# Patient Record
Sex: FEMALE | Race: WHITE | HISPANIC OR LATINO
[De-identification: names, ages, dates, MRNs, and addresses within clinical notes are randomized per-mention and may not be internally consistent; named-entity substitution may affect disease eponyms.]

---

## 2021-05-18 PROBLEM — Z00.00 ENCOUNTER FOR PREVENTIVE HEALTH EXAMINATION: Status: ACTIVE | Noted: 2021-05-18

## 2021-05-27 ENCOUNTER — APPOINTMENT (OUTPATIENT)
Dept: OBGYN | Facility: CLINIC | Age: 41
End: 2021-05-27
Payer: COMMERCIAL

## 2021-05-27 ENCOUNTER — LABORATORY RESULT (OUTPATIENT)
Age: 41
End: 2021-05-27

## 2021-05-27 VITALS
DIASTOLIC BLOOD PRESSURE: 80 MMHG | HEIGHT: 70 IN | BODY MASS INDEX: 25.77 KG/M2 | WEIGHT: 180 LBS | SYSTOLIC BLOOD PRESSURE: 118 MMHG

## 2021-05-27 DIAGNOSIS — Z01.419 ENCOUNTER FOR GYNECOLOGICAL EXAMINATION (GENERAL) (ROUTINE) W/OUT ABNORMAL FINDINGS: ICD-10-CM

## 2021-05-27 PROCEDURE — 99072 ADDL SUPL MATRL&STAF TM PHE: CPT

## 2021-05-27 PROCEDURE — 99385 PREV VISIT NEW AGE 18-39: CPT

## 2021-05-27 PROCEDURE — 36415 COLL VENOUS BLD VENIPUNCTURE: CPT

## 2021-05-27 PROCEDURE — 99203 OFFICE O/P NEW LOW 30 MIN: CPT | Mod: 25

## 2021-05-27 RX ORDER — LEVOTHYROXINE SODIUM 0.17 MG/1
TABLET ORAL
Refills: 0 | Status: ACTIVE | COMMUNITY

## 2021-05-27 RX ORDER — CHLORHEXIDINE GLUCONATE 4 %
325 (65 FE) LIQUID (ML) TOPICAL
Refills: 0 | Status: ACTIVE | COMMUNITY

## 2021-05-27 NOTE — HISTORY OF PRESENT ILLNESS
[FreeTextEntry1] : ANSHU KOVACS is a 40 year F  LMP last week here for annual visit but also wants to discuss hysterectomy. pateint reports she is chronic anemic. SHe has irregular menses twice monthly since emergency c/s of her son  2/2 eclampsia. Pateint was hospitalized for a month and went into coma. Patient reports menses last 5 days, 4/5 days are very heavy. Patient also attributes her anemia to her gastric bypass surgery. Denies any pain. She has tried DMPA injections but gained 30 lb on it. She has also tried mirena x 7 with no success with placement - they attribute it to the c/s. Patient has had a BTL, abdominoplasty complicated by necrotizing fasciitis. Patient is sexually active no dyspareunia. \par \par BC:btl\par Gynhx: Irreg menses, No h/o STIs/fibroids/cysts/abn paps\par Obhx: c/s x1 at 34 weeks, eclampsia in [place]\par pshx: thyroidectomy for benign thyroid tumors, breast augmentation, btl, c/s, gastric bypass, knee surgery b/l\par meds: synthroid, ferrous sulfate\par \par Last mammo:never\par Last Colonoscopy:a few years ago - had polypectomy\par Last Pap smear: unsure\par Last dexa:never\par

## 2021-05-27 NOTE — DISCUSSION/SUMMARY
[FreeTextEntry1] : ANSHU KOVACS is a 40 year  here for WWE, aub, normal exam\par - pap/hpv,  sent\par - SBE reviewed, for mammo\par - pcos labs, cbc sent\par - for tvus and visit to discuss results\par \par

## 2021-05-27 NOTE — PHYSICAL EXAM
[Appropriately responsive] : appropriately responsive [Alert] : alert [No Acute Distress] : no acute distress [No Lymphadenopathy] : no lymphadenopathy [Regular Rate Rhythm] : regular rate rhythm [No Murmurs] : no murmurs [Soft] : soft [Non-tender] : non-tender [Non-distended] : non-distended [No HSM] : No HSM [No Lesions] : no lesions [No Mass] : no mass [Oriented x3] : oriented x3 [FreeTextEntry3] : scar from thyroidectmy [FreeTextEntry7] : abdominoplasty scar [Examination Of The Breasts] : a normal appearance [Normal] : normal [Breast Reconstruction Right] : breast reconstruction [Breast Reconstruction Left] : breast reconstruction [No Masses] : no breast masses were palpable

## 2021-05-28 LAB
BASOPHILS # BLD AUTO: 0.05 K/UL
BASOPHILS NFR BLD AUTO: 0.6 %
C TRACH RRNA SPEC QL NAA+PROBE: NOT DETECTED
EOSINOPHIL # BLD AUTO: 0.06 K/UL
EOSINOPHIL NFR BLD AUTO: 0.8 %
ESTIMATED AVERAGE GLUCOSE: 111 MG/DL
ESTRADIOL SERPL-MCNC: 141 PG/ML
FSH SERPL-MCNC: 2.5 IU/L
HBA1C MFR BLD HPLC: 5.5 %
HCT VFR BLD CALC: 35.1 %
HGB BLD-MCNC: 10.1 G/DL
HPV HIGH+LOW RISK DNA PNL CVX: NOT DETECTED
IMM GRANULOCYTES NFR BLD AUTO: 0.3 %
LH SERPL-ACNC: 4.6 IU/L
LYMPHOCYTES # BLD AUTO: 2.65 K/UL
LYMPHOCYTES NFR BLD AUTO: 34.3 %
MAN DIFF?: NORMAL
MCHC RBC-ENTMCNC: 23.2 PG
MCHC RBC-ENTMCNC: 28.8 GM/DL
MCV RBC AUTO: 80.5 FL
MONOCYTES # BLD AUTO: 0.61 K/UL
MONOCYTES NFR BLD AUTO: 7.9 %
N GONORRHOEA RRNA SPEC QL NAA+PROBE: NOT DETECTED
NEUTROPHILS # BLD AUTO: 4.34 K/UL
NEUTROPHILS NFR BLD AUTO: 56.1 %
PLATELET # BLD AUTO: 398 K/UL
PROLACTIN SERPL-MCNC: 18.6 NG/ML
RBC # BLD: 4.36 M/UL
RBC # FLD: 16.9 %
SOURCE TP AMPLIFICATION: NORMAL
TSH SERPL-ACNC: 0.01 UIU/ML
WBC # FLD AUTO: 7.73 K/UL

## 2021-06-03 LAB
17OHP SERPL-MCNC: 80 NG/DL
CYTOLOGY CVX/VAG DOC THIN PREP: NORMAL
TESTOST BND SERPL-MCNC: 0.9 PG/ML
TESTOST SERPL-MCNC: 35.1 NG/DL

## 2021-06-07 ENCOUNTER — ASOB RESULT (OUTPATIENT)
Age: 41
End: 2021-06-07

## 2021-06-07 ENCOUNTER — APPOINTMENT (OUTPATIENT)
Dept: OBGYN | Facility: CLINIC | Age: 41
End: 2021-06-07
Payer: COMMERCIAL

## 2021-06-07 VITALS
WEIGHT: 180 LBS | DIASTOLIC BLOOD PRESSURE: 80 MMHG | HEIGHT: 70 IN | SYSTOLIC BLOOD PRESSURE: 120 MMHG | BODY MASS INDEX: 25.77 KG/M2

## 2021-06-07 VITALS — TEMPERATURE: 98 F

## 2021-06-07 DIAGNOSIS — D21.9 BENIGN NEOPLASM OF CONNECTIVE AND OTHER SOFT TISSUE, UNSPECIFIED: ICD-10-CM

## 2021-06-07 DIAGNOSIS — N93.9 ABNORMAL UTERINE AND VAGINAL BLEEDING, UNSPECIFIED: ICD-10-CM

## 2021-06-07 DIAGNOSIS — D64.9 ANEMIA, UNSPECIFIED: ICD-10-CM

## 2021-06-07 LAB — DHEA-SULFATE, SERUM: 87 UG/DL

## 2021-06-07 PROCEDURE — 99072 ADDL SUPL MATRL&STAF TM PHE: CPT

## 2021-06-07 PROCEDURE — 76830 TRANSVAGINAL US NON-OB: CPT

## 2021-06-07 PROCEDURE — 99213 OFFICE O/P EST LOW 20 MIN: CPT

## 2021-06-07 PROCEDURE — 76856 US EXAM PELVIC COMPLETE: CPT | Mod: 59

## 2021-06-07 NOTE — HISTORY OF PRESENT ILLNESS
[FreeTextEntry1] : 41 yr old P1 here for f/u AUB. Patient had sonso today which reveal AV uterus with EE 5 mm, 1.5 cm intramural fibroid, b/l ovaries normal. \par \par Patients blood work showed anemia 10.4 - pt on iron, chronic anemia\par tsh 0.01 with normal t4 - pt aware and her PCP and her had a conversations and she does not feel well when she lowers her synthroid to 125 therefore they kept her at 137 mcg\par \par Pateint declining medication for AUB, tried depo and had multiple failed IUD attempts. \par \par Patient desires deinfintive surgery\par \par I spoke with pateint in regards to the surgery - we will try RATLH bs with cystoscopy, given in previous surgeries we may need to convert to open. Patient aware\par

## 2021-07-06 ENCOUNTER — APPOINTMENT (OUTPATIENT)
Dept: MAMMOGRAPHY | Facility: CLINIC | Age: 41
End: 2021-07-06
Payer: COMMERCIAL

## 2021-07-06 ENCOUNTER — RESULT REVIEW (OUTPATIENT)
Age: 41
End: 2021-07-06

## 2021-07-06 PROCEDURE — 77067 SCR MAMMO BI INCL CAD: CPT

## 2021-07-06 PROCEDURE — 77063 BREAST TOMOSYNTHESIS BI: CPT

## 2021-09-10 ENCOUNTER — OUTPATIENT (OUTPATIENT)
Dept: OUTPATIENT SERVICES | Facility: HOSPITAL | Age: 41
LOS: 1 days | End: 2021-09-10

## 2021-09-21 ENCOUNTER — APPOINTMENT (OUTPATIENT)
Dept: DISASTER EMERGENCY | Facility: CLINIC | Age: 41
End: 2021-09-21

## 2021-09-21 DIAGNOSIS — Z01.818 ENCOUNTER FOR OTHER PREPROCEDURAL EXAMINATION: ICD-10-CM

## 2021-09-22 LAB — SARS-COV-2 N GENE NPH QL NAA+PROBE: NOT DETECTED

## 2021-09-24 ENCOUNTER — OUTPATIENT (OUTPATIENT)
Dept: OUTPATIENT SERVICES | Facility: HOSPITAL | Age: 41
LOS: 1 days | End: 2021-09-24
Payer: COMMERCIAL

## 2021-09-24 ENCOUNTER — APPOINTMENT (OUTPATIENT)
Dept: OBGYN | Facility: HOSPITAL | Age: 41
End: 2021-09-24

## 2021-09-24 DIAGNOSIS — G89.18 OTHER ACUTE POSTPROCEDURAL PAIN: ICD-10-CM

## 2021-09-24 PROCEDURE — 58571 TLH W/T/O 250 G OR LESS: CPT

## 2021-09-24 PROCEDURE — 58571 TLH W/T/O 250 G OR LESS: CPT | Mod: 80

## 2021-09-24 RX ORDER — ACETAMINOPHEN 325 MG/1
325 TABLET, FILM COATED ORAL EVERY 6 HOURS
Qty: 112 | Refills: 0 | Status: ACTIVE | COMMUNITY
Start: 2021-09-24 | End: 1900-01-01

## 2021-09-24 RX ORDER — IBUPROFEN 600 MG/1
600 TABLET, FILM COATED ORAL 4 TIMES DAILY
Qty: 112 | Refills: 0 | Status: ACTIVE | COMMUNITY
Start: 2021-09-24 | End: 1900-01-01

## 2021-09-24 RX ORDER — OXYCODONE AND ACETAMINOPHEN 5; 325 MG/1; MG/1
5-325 TABLET ORAL
Qty: 24 | Refills: 0 | Status: ACTIVE | COMMUNITY
Start: 2021-09-24 | End: 1900-01-01

## 2021-10-05 ENCOUNTER — APPOINTMENT (OUTPATIENT)
Dept: OBGYN | Facility: CLINIC | Age: 41
End: 2021-10-05
Payer: COMMERCIAL

## 2021-10-05 VITALS
BODY MASS INDEX: 25.77 KG/M2 | WEIGHT: 180 LBS | DIASTOLIC BLOOD PRESSURE: 72 MMHG | HEIGHT: 70 IN | SYSTOLIC BLOOD PRESSURE: 100 MMHG

## 2021-10-05 PROCEDURE — 99024 POSTOP FOLLOW-UP VISIT: CPT

## 2021-10-05 NOTE — HISTORY OF PRESENT ILLNESS
[FreeTextEntry1] : patinet here for post op visit incision check s/p RATLH BS for menorrhaghia/dysmenorrhea, symptomatic anemia. Patinet reports normal bowel movemebts, urination. Reports good appetite. no fevers, chills, not on pain medication, no vaginal bleeding. feeling great \par \par path: leomyoma, adenomyosis, normal fallopian tubes

## 2021-10-05 NOTE — PHYSICAL EXAM
[Appropriately responsive] : appropriately responsive [Alert] : alert [Soft] : soft [Non-tender] : non-tender [Non-distended] : non-distended [FreeTextEntry7] : incison c/d/i

## 2021-11-02 ENCOUNTER — APPOINTMENT (OUTPATIENT)
Dept: OBGYN | Facility: CLINIC | Age: 41
End: 2021-11-02
Payer: COMMERCIAL

## 2021-11-02 VITALS
WEIGHT: 180 LBS | DIASTOLIC BLOOD PRESSURE: 78 MMHG | BODY MASS INDEX: 25.77 KG/M2 | HEIGHT: 70 IN | SYSTOLIC BLOOD PRESSURE: 124 MMHG

## 2021-11-02 VITALS — TEMPERATURE: 97.2 F

## 2021-11-02 DIAGNOSIS — Z48.89 ENCOUNTER FOR OTHER SPECIFIED SURGICAL AFTERCARE: ICD-10-CM

## 2021-11-02 PROCEDURE — 99024 POSTOP FOLLOW-UP VISIT: CPT

## 2021-11-02 NOTE — HISTORY OF PRESENT ILLNESS
[FreeTextEntry1] : patinet here for post op visit  s/p RAÚL BS for menorrhaghia/dysmenorrhea, symptomatic anemia. Patinet reports normal bowel movemebts, urination. Reports good appetite. no fevers, chills, not on pain medication, no vaginal bleeding. feeling great \par \par path: leomyoma, adenomyosis, normal fallopian tubes

## 2021-11-02 NOTE — PHYSICAL EXAM
[Labia Majora] : normal [Labia Minora] : normal [Normal] : normal [Absent] : absent [Uterine Adnexae] : normal [FreeTextEntry4] : cuff healing well - almost ocmpletely healed